# Patient Record
Sex: FEMALE | Race: WHITE | Employment: OTHER | URBAN - METROPOLITAN AREA
[De-identification: names, ages, dates, MRNs, and addresses within clinical notes are randomized per-mention and may not be internally consistent; named-entity substitution may affect disease eponyms.]

---

## 2018-10-29 ENCOUNTER — TRANSFERRED RECORDS (OUTPATIENT)
Dept: HEALTH INFORMATION MANAGEMENT | Facility: CLINIC | Age: 83
End: 2018-10-29

## 2018-10-30 ENCOUNTER — TRANSFERRED RECORDS (OUTPATIENT)
Dept: HEALTH INFORMATION MANAGEMENT | Facility: CLINIC | Age: 83
End: 2018-10-30

## 2018-11-14 ENCOUNTER — TRANSFERRED RECORDS (OUTPATIENT)
Dept: HEALTH INFORMATION MANAGEMENT | Facility: CLINIC | Age: 83
End: 2018-11-14

## 2018-11-20 ENCOUNTER — TRANSFERRED RECORDS (OUTPATIENT)
Dept: HEALTH INFORMATION MANAGEMENT | Facility: CLINIC | Age: 83
End: 2018-11-20

## 2018-11-21 ENCOUNTER — MEDICAL CORRESPONDENCE (OUTPATIENT)
Dept: HEALTH INFORMATION MANAGEMENT | Facility: CLINIC | Age: 83
End: 2018-11-21

## 2018-11-27 ENCOUNTER — TELEPHONE (OUTPATIENT)
Dept: ONCOLOGY | Facility: CLINIC | Age: 83
End: 2018-11-27

## 2018-11-27 NOTE — TELEPHONE ENCOUNTER
PT wants to consult with her PCP and see if their can be a telephone call or provider to provider discussion for next step due to distance of drive.

## 2018-11-29 ENCOUNTER — TELEPHONE (OUTPATIENT)
Dept: ONCOLOGY | Facility: CLINIC | Age: 83
End: 2018-11-29

## 2018-11-29 NOTE — TELEPHONE ENCOUNTER
ONCOLOGY INTAKE: Records Information      APPT INFORMATION: 12/7/18  Referring provider:  Kimberly Meek  Referring provider s clinic:  [facility]  Reason for visit/diagnosis:  Pelvic Mass    Were the records received with the referral (via [employer])? No - In Care Everywhere    Has patient been seen for any external appt for this diagnosis (enter clinic/location)? [facility]

## 2018-12-06 NOTE — TELEPHONE ENCOUNTER
RECORDS STATUS - ALL OTHER DIAGNOSIS      RECORDS RECEIVED FROM: [facility] / [facility]   DATE RECEIVED: 12/06/18   NOTES STATUS DETAILS   OFFICE NOTE from referring provider 11/30/18 [facility] (in [place])   OFFICE NOTE from medical oncologist 11/21/18 [facility] (in [place])   DISCHARGE SUMMARY from hospital None    DISCHARGE REPORT from the ER None    OPERATIVE REPORT 11/20/18 Bx [facility] (in [place])   MEDICATION LIST None    CLINICAL TRIAL TREATMENTS TO DATE None    LABS     PATHOLOGY REPORTS 11/20/18 [facility] (in [place])   ANYTHING RELATED TO DIAGNOSIS     GENONOMIC TESTING     TYPE: None    IMAGING (NEED IMAGES & REPORT)     CT SCANS 11/20/18 CT Chest  10/30/18 CT Abd Pelv [facility] (in [place])   MRI None    MAMMO None    ULTRASOUND 11/20/18 US Bx [facility] (in [place])   PET None

## 2018-12-07 ENCOUNTER — HOSPITAL ENCOUNTER (INPATIENT)
Facility: CLINIC | Age: 83
Setting detail: SURGERY ADMIT
End: 2018-12-07
Attending: OBSTETRICS & GYNECOLOGY | Admitting: OBSTETRICS & GYNECOLOGY
Payer: MEDICARE

## 2018-12-07 ENCOUNTER — ONCOLOGY VISIT (OUTPATIENT)
Dept: ONCOLOGY | Facility: CLINIC | Age: 83
End: 2018-12-07
Attending: OBSTETRICS & GYNECOLOGY
Payer: COMMERCIAL

## 2018-12-07 ENCOUNTER — PRE VISIT (OUTPATIENT)
Dept: ONCOLOGY | Facility: CLINIC | Age: 83
End: 2018-12-07

## 2018-12-07 VITALS
SYSTOLIC BLOOD PRESSURE: 146 MMHG | DIASTOLIC BLOOD PRESSURE: 73 MMHG | WEIGHT: 99.43 LBS | TEMPERATURE: 97.7 F | HEART RATE: 76 BPM | RESPIRATION RATE: 18 BRPM | OXYGEN SATURATION: 96 %

## 2018-12-07 DIAGNOSIS — R19.00 PELVIC MASS: Primary | ICD-10-CM

## 2018-12-07 PROCEDURE — 99205 OFFICE O/P NEW HI 60 MIN: CPT | Mod: ZP | Performed by: OBSTETRICS & GYNECOLOGY

## 2018-12-07 PROCEDURE — G0463 HOSPITAL OUTPT CLINIC VISIT: HCPCS | Mod: ZF

## 2018-12-07 RX ORDER — LISINOPRIL 5 MG/1
5 TABLET ORAL
COMMUNITY
Start: 2014-08-08

## 2018-12-07 RX ORDER — HYDROCHLOROTHIAZIDE 25 MG/1
25 TABLET ORAL
COMMUNITY
Start: 2018-01-15

## 2018-12-07 RX ORDER — NICOTINE POLACRILEX 4 MG/1
20 GUM, CHEWING ORAL
COMMUNITY

## 2018-12-07 RX ORDER — SERTRALINE HYDROCHLORIDE 25 MG/1
TABLET, FILM COATED ORAL
COMMUNITY
Start: 2018-11-27

## 2018-12-07 RX ORDER — WARFARIN SODIUM 2.5 MG/1
2.5 TABLET ORAL
COMMUNITY

## 2018-12-07 RX ORDER — MV-MN/OM3/DHA/EPA/FISH/LUT/ZEA 250-5-1 MG
1 CAPSULE ORAL
COMMUNITY

## 2018-12-07 RX ORDER — METOPROLOL TARTRATE 25 MG/1
TABLET, FILM COATED ORAL 2 TIMES DAILY
COMMUNITY
Start: 2018-10-17

## 2018-12-07 ASSESSMENT — PAIN SCALES - GENERAL: PAINLEVEL: MILD PAIN (2)

## 2018-12-07 NOTE — PROGRESS NOTES
GYN Oncology New Patient Consult    Referring provider:    Kimberly HARRISON [facility]  [address] [place] [address]   RE: Santa Means  : 1927  RACNHA: 2018    CC: pelvic mass    HPI: Ms Santa Means is a 89 year old year old  female who presents for consultation regarding a complex pelvic mass and elevated ca-125. She is here today with her , anisa and daughter in law.  She notes abdominal pain and bloating for 1-2 months. Some nonspecific diffuse abdominal pain. No bleeding. Poor PO intake.   Does all ADL, walks with cane  Daughter in [place]  Daughter-in-law nearby  Son recently  of small cell lung cancer.   On coumadin for Afib, never had a stroke.     Review of Systems:  Systemic:No weight changes.    Skin : No skin changes or new lesions.   Eye : No changes in vision.   Pulmonary: No cough or SOB.   Cardiovascular: No CP or palpitations  Gastrointestinal : No diarrhea, constipation, abdominal pain. Bowel habits normal.   Genitourinary: No dysuria, urgency or bleeding  Psychiatric: No depression or anxiety  Hematologic : No palpable lymph nodes.   Endocrine : No hot flashes. No heat/cold intolerance.      Neurological: No headaches, no numbness.     Past Medical History:   Diagnosis Date     Basal cell carcinoma      Dysthymia      GERD (gastroesophageal reflux disease)      Hyperlipidemia      Hypertension      Osteoporosis      Paroxysmal atrial fibrillation (H)        Past Surgical History:   Procedure Laterality Date     APPENDECTOMY      age 5     C VAGINAL HYSTERECTOMY       CATARACT IOL, RT/LT       OPEN REDUCTION INTERNAL FIXATION FEMUR DISTAL       TONSILLECTOMY          OBGYN history and Health Maintenance:  ,  x 2  Last Pap Smear: N/A  Last Mammogram:Stopped at age 85  Last Colonoscopy: never     Current Outpatient Prescriptions   Medication Sig Dispense Refill     hydrochlorothiazide (HYDRODIURIL) 25 MG tablet Take 25  mg by mouth       lisinopril (PRINIVIL/ZESTRIL) 5 MG tablet Take 5 mg by mouth       UNABLE TO FIND MEDICATION NAME: Octuvite vitamin  For the eyes       metoprolol tartrate (LOPRESSOR) 25 MG tablet        Multiple Vitamins-Minerals (OCUVITE ADULT 50+) CAPS Take 1 tablet by mouth       omeprazole 20 MG tablet Take 20 mg by mouth       sertraline (ZOLOFT) 25 MG tablet        warfarin (COUMADIN) 2.5 MG tablet Take 2.5 mg by mouth              Allergies   Allergen Reactions     Morphine Other (See Comments)        Social History:  Social History   Substance Use Topics     Smoking status: Not on file     Smokeless tobacco: Not on file     Alcohol use Not on file     Work: N/A  Ethnicity identification:   Preferred language:   Lives at home with: , Ruy (Harpreet).     Family History:   The patient's family history is notable for :  Son with lung cancer (Small Cell)     Physical Exam:     /73  Pulse 76  Temp 97.7  F (36.5  C) (Tympanic)  Resp 18  Wt 45.1 kg (99 lb 6.8 oz)  SpO2 96%  There is no height or weight on file to calculate BMI.    General: Alert and oriented, no acute distress. Thin but not cachectic. Normal mentation and speech.   Psych: Mood stable  Cardiovascular: RRR, no murmors  Pulmonary: Lungs clear . Normal respiratory effort  GI: Distended in lower abdomen. Mass not palpated. No fluid wave  Lymph: enlarged lymph nodes in  groin  : Normal external genitalia. Cervix absent. Vagina narrow and atrophic. Uterus absent. Mass palpated high in pelvis, difficult to assess mobility        Cr 0.8  Ca-125 620.5  WBC 8.2  Hg 12.9  Plat 253  CEA 3      Last Echo 6/2017: :  AFib. Left atrial enlargement. Right atrial enlargement. MV with moderate regurg, EF 60-65%, normal wall thickness. No wall abnormalities. Normal RV. Normal pulmonic valve. No effusion. Severe pulmonary hypertension.       CT C/A/P:   1. Large pelvic mass with minimal ascites. 2. Large right inguinal lymph node 3. Mild fatty  infiltration of liver. Chest CT negative for  Metastatic dz    11/20/18: Right inguinal node biopsy. Benign fibroadipose soft tissue, no lymph node tissue or evidence of neoplasm identified.     Assessment:    Santa Means is a 89 year old woman with a new diagnosis of complex pelvic mass, elevated Ca-125. I have reviewed labs, imaging, OSH reports      Plan:     1.)   Pelvic mass: We discussed that her signs and symptoms are most consistent with ovarian cancer. We discussed that the the treatment for ovarian cancer includes a combination of surgery and systemic platinum/taxane based chemotherapy. Given her age, comorbidities I am concerned that she would not tolerate a debulking surgery at this time. I would prefer a more palliative approach to lengthen her life while still maintaining quality of life. I do need a tissue biopsy prior to starting chemotherapy. We reviewed options including CT guided biopsy versus laparoscopic biopsy. We discussed that once we have path, if confirmed ovarian cancer, I would recommend q4 week carbo AUC 5. If she is tolerating it well, would then consider increase dose and/or frequency. Discussed surgical risks of diagnostic laparoscopy not limited to bleeding, infection, damage to surrounding organs, need for blood transfusions and ICU stay.      -Plan to review with CT/radiology for possible biopsy  -If not feasible, plan diagnostic laparoscopy with peritoneal biopsies, possible removal of one or both ovaries. Would not plan laparotomy  -Once path confirmed, likely Carbo AUC5 q 4 weeks  -Would want her off her coumadin for 1 week, would not plan to bridge, but would restart post-op      2.) Genetic risk factors were assessed: final path pending      A total of 60 minutes was spent with the patient, 55 minutes of which were spent in counseling the patient and/or treatment planning.    Alexsandra Lovelace MD    Department of Ob/Gyn and Women's Health  Division  of Gynecologic Oncology  [facility]  [phone]

## 2018-12-07 NOTE — NURSING NOTE
Pre Op Nurse Teaching Template    Relevant Diagnosis: Pelvic mass     Teaching Topic: Diagnostic laparotomy, peritoneal biopsy, possible removal of one or both ovaries     Person(s) involved in teaching :  Patient  & spouse and daughter in law   Motivation Level:  Asks Questions:    Yes      Eager to Learn:     Yes     Cooperative:          Yes    Receptive (willing. Able to accept information):    Yes      Patient and those who are listed above demonstrates understanding of the following:   Reason for the appointment, diagnosis and treatment plan:   Yes   Knowledge of proper use of medications and conditions for which they are ordered (with special attention to potential side effects or drug interactions): Yes   Which situations necessitate calling provider and whom to contact: Yes         Nutritional needs and diet plan:  Yes      Pain management techniques:     Yes, Pain Scale   Diet:   Yes, [facility] Diet Instructions    Teaching Concerns addressed: Yes    Infection Prevention:  Patient and those who are listed above demonstrate understanding of the following:  Pre-Op CHG Bathing Instructions: Yes  Surgical procedure site care taught:   Yes   Signs and symptoms of infection taught: Yes       Instructional Materials Used/Given:  The [place] Before You Surgery Booklet  Showering or Bathing before Surgery Instructions & CHG Product  Hysterectomy Guidelines  Pain Assessment Tool   Home Care after Major Abdominal or Vaginal Surgery  Map  Accommodations Brochure  Phone numbers for [facility] and [facility]  Copy of Surgical Consent    Comments:  ARABELLA Mcarthur RN

## 2018-12-07 NOTE — NURSING NOTE
Oncology Rooming Note    December 7, 2018 1:46 PM   Santa Means is a 89 year old female who presents for:    Chief Complaint   Patient presents with     Oncology Clinic Visit     Pelvic Mass      Initial Vitals: /73  Pulse 76  Temp 97.7  F (36.5  C) (Tympanic)  Resp 18  Wt 45.1 kg (99 lb 6.8 oz)  SpO2 96% There is no height or weight on file to calculate BMI. There is no height or weight on file to calculate BSA.  Mild Pain (2) Comment: Data Unavailable   No LMP recorded.  Allergies reviewed: Yes  Medications reviewed: Yes    Medications: Medication refills not needed today.  Pharmacy name entered into EPIC: Data Unavailable    Clinical concerns: result jose miguel Lovelace  was notified.    10  minutes for nursing intake (face to face time)     Arabella Valencia MA

## 2018-12-07 NOTE — LETTER
2018       RE: Santa Means  [address]  [address]  [place] [address]     Dear Colleague,    Thank you for referring your patient, Santa Means, to the [facility]. Please see a copy of my visit note below.    GYN Oncology New Patient Consult    Referring provider:    Kimberly HARRISON [facility]  [address]     [place] [address]   RE: Santa Means  : 1927  RACHNA: 2018    CC: pelvic mass    HPI: Ms Santa Means is a 89 year old year old  female who presents for consultation regarding a complex pelvic mass and elevated ca-125. She is here today with her , anisa and daughter in law.  She notes abdominal pain and bloating for 1-2 months. Some nonspecific diffuse abdominal pain. No bleeding. Poor PO intake.   Does all ADL, walks with cane  Daughter in [place]  Daughter-in-law nearby  Son recently  of small cell lung cancer.   On coumadin for Afib, never had a stroke.     Review of Systems:  Systemic:No weight changes.    Skin : No skin changes or new lesions.   Eye : No changes in vision.   Pulmonary: No cough or SOB.   Cardiovascular: No CP or palpitations  Gastrointestinal : No diarrhea, constipation, abdominal pain. Bowel habits normal.   Genitourinary: No dysuria, urgency or bleeding  Psychiatric: No depression or anxiety  Hematologic : No palpable lymph nodes.   Endocrine : No hot flashes. No heat/cold intolerance.      Neurological: No headaches, no numbness.     Past Medical History:   Diagnosis Date     Basal cell carcinoma      Dysthymia      GERD (gastroesophageal reflux disease)      Hyperlipidemia      Hypertension      Osteoporosis      Paroxysmal atrial fibrillation (H)        Past Surgical History:   Procedure Laterality Date     APPENDECTOMY      age 5     C VAGINAL HYSTERECTOMY       CATARACT IOL, RT/LT       OPEN REDUCTION INTERNAL FIXATION FEMUR DISTAL       TONSILLECTOMY          OBGYN  history and Health Maintenance:  ,  x 2  Last Pap Smear: N/A  Last Mammogram:Stopped at age 85  Last Colonoscopy: never     Current Outpatient Prescriptions   Medication Sig Dispense Refill     hydrochlorothiazide (HYDRODIURIL) 25 MG tablet Take 25 mg by mouth       lisinopril (PRINIVIL/ZESTRIL) 5 MG tablet Take 5 mg by mouth       UNABLE TO FIND MEDICATION NAME: Octuvite vitamin  For the eyes       metoprolol tartrate (LOPRESSOR) 25 MG tablet        Multiple Vitamins-Minerals (OCUVITE ADULT 50+) CAPS Take 1 tablet by mouth       omeprazole 20 MG tablet Take 20 mg by mouth       sertraline (ZOLOFT) 25 MG tablet        warfarin (COUMADIN) 2.5 MG tablet Take 2.5 mg by mouth              Allergies   Allergen Reactions     Morphine Other (See Comments)        Social History:  Social History   Substance Use Topics     Smoking status: Not on file     Smokeless tobacco: Not on file     Alcohol use Not on file     Work: N/A  Ethnicity identification:   Preferred language:   Lives at home with: , Ruy ([place]arpreet).     Family History:   The patient's family history is notable for :  Son with lung cancer (Small Cell)     Physical Exam:     /73  Pulse 76  Temp 97.7  F (36.5  C) (Tympanic)  Resp 18  Wt 45.1 kg (99 lb 6.8 oz)  SpO2 96%  There is no height or weight on file to calculate BMI.    General: Alert and oriented, no acute distress. Thin but not cachectic. Normal mentation and speech.   Psych: Mood stable  Cardiovascular: RRR, no murmors  Pulmonary: Lungs clear . Normal respiratory effort  GI: Distended in lower abdomen. Mass not palpated. No fluid wave  Lymph: enlarged lymph nodes in  groin  : Normal external genitalia. Cervix absent. Vagina narrow and atrophic. Uterus absent. Mass palpated high in pelvis, difficult to assess mobility        Cr 0.8  Ca-125 620.5  WBC 8.2  Hg 12.9  Plat 253  CEA 3      Last Echo 2017: :  AFib. Left atrial enlargement. Right atrial enlargement. MV with  moderate regurg, EF 60-65%, normal wall thickness. No wall abnormalities. Normal RV. Normal pulmonic valve. No effusion. Severe pulmonary hypertension.       CT C/A/P:   1. Large pelvic mass with minimal ascites. 2. Large right inguinal lymph node 3. Mild fatty infiltration of liver. Chest CT negative for  Metastatic dz    11/20/18: Right inguinal node biopsy. Benign fibroadipose soft tissue, no lymph node tissue or evidence of neoplasm identified.     Assessment:    Santa Means is a 89 year old woman with a new diagnosis of complex pelvic mass, elevated Ca-125. I have reviewed labs, imaging, OSH reports      Plan:     1.)   Pelvic mass: We discussed that her signs and symptoms are most consistent with ovarian cancer. We discussed that the the treatment for ovarian cancer includes a combination of surgery and systemic platinum/taxane based chemotherapy. Given her age, comorbidities I am concerned that she would not tolerate a debulking surgery at this time. I would prefer a more palliative approach to lengthen her life while still maintaining quality of life. I do need a tissue biopsy prior to starting chemotherapy. We reviewed options including CT guided biopsy versus laparoscopic biopsy. We discussed that once we have path, if confirmed ovarian cancer, I would recommend q4 week carbo AUC 5. If she is tolerating it well, would then consider increase dose and/or frequency. Discussed surgical risks of diagnostic laparoscopy not limited to bleeding, infection, damage to surrounding organs, need for blood transfusions and ICU stay.      -Plan to review with CT/radiology for possible biopsy  -If not feasible, plan diagnostic laparoscopy with peritoneal biopsies, possible removal of one or both ovaries. Would not plan laparotomy  -Once path confirmed, likely Carbo AUC5 q 4 weeks  -Would want her off her coumadin for 1 week, would not plan to bridge, but would restart post-op      2.) Genetic risk factors were  assessed: final path pending      A total of 60 minutes was spent with the patient, 55 minutes of which were spent in counseling the patient and/or treatment planning.    Alexsandra Lovelace MD    Department of Ob/Gyn and Women's Health  Division of Gynecologic Oncology  [facility]  [phone]

## 2018-12-10 ENCOUNTER — TELEPHONE (OUTPATIENT)
Dept: ONCOLOGY | Facility: CLINIC | Age: 83
End: 2018-12-10

## 2018-12-10 ENCOUNTER — DOCUMENTATION ONLY (OUTPATIENT)
Dept: INTERVENTIONAL RADIOLOGY/VASCULAR | Facility: CLINIC | Age: 83
End: 2018-12-10

## 2018-12-10 DIAGNOSIS — R19.00 PELVIC MASS: Primary | ICD-10-CM

## 2018-12-10 NOTE — PROGRESS NOTES
Patient to be placed  on  Interventional Radiology schedule  for  A CT  guided  Pelvic mass biopsy    Discussed with Dr. Luis Carlos Jimenez IR [facility]  [phone] Call pager  [phone] pager

## 2018-12-11 ENCOUNTER — TELEPHONE (OUTPATIENT)
Dept: ONCOLOGY | Facility: CLINIC | Age: 83
End: 2018-12-11

## 2018-12-11 NOTE — TELEPHONE ENCOUNTER
RN was asked to reach out to patient to update her on change of surgery date. Surgery moved to Dec 17th.    Patient and RN reviewed the patient will need to see PCP sooner than later and will need direction on stopping her coumadin. Patient aware she will need to be off coumadin for a week (now) prior to surgery. PCP will help give her direction on this. She will also pauline pre-op clearance from the PCP. Patient aware and agrees to the plan.     Rayen Mcarthur RN

## 2018-12-12 DIAGNOSIS — R19.00 PELVIC MASS: Primary | ICD-10-CM

## 2018-12-13 ENCOUNTER — TELEPHONE (OUTPATIENT)
Dept: ONCOLOGY | Facility: CLINIC | Age: 83
End: 2018-12-13

## 2018-12-14 RX ORDER — ATORVASTATIN CALCIUM 40 MG/1
40 TABLET, FILM COATED ORAL DAILY
COMMUNITY
End: 2018-12-17 | Stop reason: HOSPADM

## 2018-12-14 SDOH — HEALTH STABILITY: MENTAL HEALTH: HOW OFTEN DO YOU HAVE A DRINK CONTAINING ALCOHOL?: NEVER

## 2018-12-14 NOTE — OR NURSING
Service notified that the [facility]  has just now called us saying that Santa is not cleared for surgery.  Writer instructed [facility] to notify Dr Lovelace's office.

## 2018-12-17 ENCOUNTER — TELEPHONE (OUTPATIENT)
Dept: ONCOLOGY | Facility: CLINIC | Age: 83
End: 2018-12-17

## 2018-12-17 ENCOUNTER — ANESTHESIA (OUTPATIENT)
Dept: SURGERY | Facility: CLINIC | Age: 83
End: 2018-12-17

## 2018-12-17 ENCOUNTER — ANESTHESIA EVENT (OUTPATIENT)
Dept: SURGERY | Facility: CLINIC | Age: 83
End: 2018-12-17

## 2018-12-17 NOTE — TELEPHONE ENCOUNTER
PCP returned RN call. Patient has follow up with them tomorrow Dec 18th. They plan on discussing possible further heart work up versus hospice.     PCP will follow up with this RN after they see patient tomorrow.     Rayne Mcarthur RN

## 2018-12-17 NOTE — TELEPHONE ENCOUNTER
RN received several voicemails in regards to patients upcoming surgery.     Patient saw PCP Friday Dec 14 for pre-op and ECG. Patient had new ischemic changes and this was concerning to the PCP. They are not clearing her for surgery at this time and are arranging for her to have further cardiac work-up.     PCP stated they reached out to patient to update her.     RN was also contacted by pre-op nursing here who states PCP did not clear patient for surgery scheduled on Dec 17th.     RN attempted to reach MD who is in the OR.     7C work room updated.     Rayne Mcarthur RN

## 2020-03-11 ENCOUNTER — HEALTH MAINTENANCE LETTER (OUTPATIENT)
Age: 85
End: 2020-03-11

## 2021-01-03 ENCOUNTER — HEALTH MAINTENANCE LETTER (OUTPATIENT)
Age: 86
End: 2021-01-03

## 2021-04-25 ENCOUNTER — HEALTH MAINTENANCE LETTER (OUTPATIENT)
Age: 86
End: 2021-04-25

## 2021-10-10 ENCOUNTER — HEALTH MAINTENANCE LETTER (OUTPATIENT)
Age: 86
End: 2021-10-10

## 2022-05-21 ENCOUNTER — HEALTH MAINTENANCE LETTER (OUTPATIENT)
Age: 87
End: 2022-05-21

## 2022-09-18 ENCOUNTER — HEALTH MAINTENANCE LETTER (OUTPATIENT)
Age: 87
End: 2022-09-18

## 2023-06-04 ENCOUNTER — HEALTH MAINTENANCE LETTER (OUTPATIENT)
Age: 88
End: 2023-06-04